# Patient Record
Sex: FEMALE | Race: BLACK OR AFRICAN AMERICAN | NOT HISPANIC OR LATINO | URBAN - METROPOLITAN AREA
[De-identification: names, ages, dates, MRNs, and addresses within clinical notes are randomized per-mention and may not be internally consistent; named-entity substitution may affect disease eponyms.]

---

## 2020-03-06 ENCOUNTER — EMERGENCY (EMERGENCY)
Facility: HOSPITAL | Age: 30
LOS: 1 days | Discharge: HOME | End: 2020-03-06
Attending: EMERGENCY MEDICINE
Payer: SELF-PAY

## 2020-03-06 VITALS
OXYGEN SATURATION: 99 % | WEIGHT: 160.06 LBS | RESPIRATION RATE: 17 BRPM | DIASTOLIC BLOOD PRESSURE: 87 MMHG | HEART RATE: 85 BPM | HEIGHT: 66 IN | SYSTOLIC BLOOD PRESSURE: 150 MMHG | TEMPERATURE: 98 F

## 2020-03-06 DIAGNOSIS — M25.661 STIFFNESS OF RIGHT KNEE, NOT ELSEWHERE CLASSIFIED: Chronic | ICD-10-CM

## 2020-03-06 LAB
APPEARANCE UR: CLEAR — SIGNIFICANT CHANGE UP
BILIRUB UR-MCNC: NEGATIVE — SIGNIFICANT CHANGE UP
COLOR SPEC: YELLOW — SIGNIFICANT CHANGE UP
DIFF PNL FLD: NEGATIVE — SIGNIFICANT CHANGE UP
GLUCOSE UR QL: NEGATIVE MG/DL — SIGNIFICANT CHANGE UP
HCG UR QL: NEGATIVE — SIGNIFICANT CHANGE UP
KETONES UR-MCNC: NEGATIVE — SIGNIFICANT CHANGE UP
LEUKOCYTE ESTERASE UR-ACNC: NEGATIVE — SIGNIFICANT CHANGE UP
NITRITE UR-MCNC: NEGATIVE — SIGNIFICANT CHANGE UP
PH UR: 7 — SIGNIFICANT CHANGE UP (ref 5–8)
PROT UR-MCNC: NEGATIVE MG/DL — SIGNIFICANT CHANGE UP
SP GR SPEC: 1.01 — SIGNIFICANT CHANGE UP (ref 1.01–1.02)
UROBILINOGEN FLD QL: NEGATIVE MG/DL — SIGNIFICANT CHANGE UP

## 2020-03-06 PROCEDURE — 72100 X-RAY EXAM L-S SPINE 2/3 VWS: CPT | Mod: 26

## 2020-03-06 PROCEDURE — 72070 X-RAY EXAM THORAC SPINE 2VWS: CPT

## 2020-03-06 PROCEDURE — 99284 EMERGENCY DEPT VISIT MOD MDM: CPT | Mod: 25

## 2020-03-06 PROCEDURE — 99284 EMERGENCY DEPT VISIT MOD MDM: CPT

## 2020-03-06 PROCEDURE — 72100 X-RAY EXAM L-S SPINE 2/3 VWS: CPT

## 2020-03-06 PROCEDURE — 81025 URINE PREGNANCY TEST: CPT

## 2020-03-06 PROCEDURE — 72070 X-RAY EXAM THORAC SPINE 2VWS: CPT | Mod: 26

## 2020-03-06 PROCEDURE — 96372 THER/PROPH/DIAG INJ SC/IM: CPT

## 2020-03-06 PROCEDURE — 81003 URINALYSIS AUTO W/O SCOPE: CPT

## 2020-03-06 RX ORDER — IBUPROFEN 200 MG
1 TABLET ORAL
Qty: 28 | Refills: 0
Start: 2020-03-06 | End: 2020-03-12

## 2020-03-06 RX ORDER — METHOCARBAMOL 500 MG/1
1 TABLET, FILM COATED ORAL
Qty: 14 | Refills: 0
Start: 2020-03-06 | End: 2020-03-12

## 2020-03-06 RX ORDER — KETOROLAC TROMETHAMINE 30 MG/ML
30 SYRINGE (ML) INJECTION ONCE
Refills: 0 | Status: DISCONTINUED | OUTPATIENT
Start: 2020-03-06 | End: 2020-03-06

## 2020-03-06 RX ORDER — METHOCARBAMOL 500 MG/1
1500 TABLET, FILM COATED ORAL ONCE
Refills: 0 | Status: COMPLETED | OUTPATIENT
Start: 2020-03-06 | End: 2020-03-06

## 2020-03-06 RX ADMIN — METHOCARBAMOL 1500 MILLIGRAM(S): 500 TABLET, FILM COATED ORAL at 13:48

## 2020-03-06 RX ADMIN — Medication 30 MILLIGRAM(S): at 13:48
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
## 2020-03-06 NOTE — ED PROVIDER NOTE - NSFOLLOWUPCLINICS_GEN_ALL_ED_FT
[id number] [place] [address]  Phone: [phone]  Fax:   Follow Up Time:     [facility] - [facility]  Neurosurgery/Spine  [address] [place] [address]  Phone: [phone]  Fax:   Follow Up Time: 4-6 Days

## 2020-03-06 NOTE — ED PROVIDER NOTE - PATIENT PORTAL LINK FT
You can access the FollowMyHealth Patient Portal offered by [facility] by registering at the following website: [email or web address]. By joining [employer]’s FollowMyHealth portal, you will also be able to view your health information using other applications (apps) compatible with our system.

## 2020-03-06 NOTE — SBIRT NOTE ADULT - NSSBIRTALCPASSREFTXDET_GEN_A_CORE
Provided SBIRT services: Full screen positive. Referral to Treatment attempted. Screening results were reviewed with the patient and patient was provided information about healthy guidelines and potential negative consequences associated with level of risk. Motivation and readiness to reduce or stop use was discussed and goals and activities to make changes were suggested/offered.  Options discussed for further evaluation and treatment, but referral to treatment was not completed because  Patient refused - Wants to make appt at [facility] -  provided resources for [facility] and [facility] Provided SBIRT services: Full screen positive. Referral to Treatment attempted. Screening results were reviewed with the patient and patient was provided information about healthy guidelines and potential negative consequences associated with level of risk. Motivation and readiness to reduce or stop use was discussed and goals and activities to make changes were suggested/offered.  Options discussed for further evaluation and treatment, but referral to treatment was not completed because  Patient refused - Wants to make appt at [facility] provided resources for [facility] and [facility]
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
## 2020-03-06 NOTE — ED ADULT NURSE NOTE - NSIMPLEMENTINTERV_GEN_ALL_ED
Implemented All Fall Risk Interventions:  [place] to call system. Call bell, personal items and telephone within reach. Instruct patient to call for assistance. Room bathroom lighting operational. Non-slip footwear when patient is off stretcher. Physically safe environment: no spills, clutter or unnecessary equipment. Stretcher in lowest position, wheels locked, appropriate side rails in place. Provide visual cue, wrist band, yellow gown, etc. Monitor gait and stability. Monitor for mental status changes and reorient to person, place, and time. Review medications for side effects contributing to fall risk. Reinforce activity limits and safety measures with patient and family.

## 2020-03-06 NOTE — ED PROVIDER NOTE - ATTENDING CONTRIBUTION TO CARE
back pain after moving a couch down a staircase.  No b/b dysfunction.  no saddle aesthesia.  no numbness/ paresthesia of legs.  PE:  nonlocalized midline and para spinal muscle tenderness in thoracic and lumbar spine.  Xray:  scoliosis of thoracic spine, no obvious fracture.  A/P nonspecific back pain: anticipatory guidance provided.

## 2020-03-06 NOTE — ED PROVIDER NOTE - OBJECTIVE STATEMENT
30 y/o female presents c/o back pain x 3 days. PT reports on Wednesday she was helping a friend move a couch down steps  and the couch fell down the steps.  She reports it caused her to "jerk forward" and landed on the couch as it went down the stairs. Denies LOC, HA, dizziness, changes in vision, nausea, vomiting, neck pain, neck stiffness, paresthesia, weakness in extremities, , sob, palpitations, , pelvic pain, or extremity pain. PT also reports chest pain with movment and deep breathing as well as generalized abdominal pain. PT also reports some dysuria this morning.

## 2020-06-05 ENCOUNTER — TRANSCRIPTION ENCOUNTER (OUTPATIENT)
Age: 30
End: 2020-06-05

## 2020-11-10 ENCOUNTER — EMERGENCY (EMERGENCY)
Facility: HOSPITAL | Age: 30
LOS: 1 days | Discharge: HOME | End: 2020-11-10
Attending: EMERGENCY MEDICINE
Payer: COMMERCIAL

## 2020-11-10 VITALS
WEIGHT: 197.09 LBS | RESPIRATION RATE: 20 BRPM | TEMPERATURE: 98 F | HEART RATE: 55 BPM | SYSTOLIC BLOOD PRESSURE: 129 MMHG | HEIGHT: 66 IN | OXYGEN SATURATION: 98 % | DIASTOLIC BLOOD PRESSURE: 60 MMHG

## 2020-11-10 VITALS
OXYGEN SATURATION: 99 % | SYSTOLIC BLOOD PRESSURE: 127 MMHG | DIASTOLIC BLOOD PRESSURE: 77 MMHG | RESPIRATION RATE: 18 BRPM | HEART RATE: 54 BPM | TEMPERATURE: 98 F

## 2020-11-10 DIAGNOSIS — M25.661 STIFFNESS OF RIGHT KNEE, NOT ELSEWHERE CLASSIFIED: Chronic | ICD-10-CM

## 2020-11-10 LAB
ALBUMIN SERPL ELPH-MCNC: 4.5 G/DL — SIGNIFICANT CHANGE UP (ref 3.3–5.2)
ALP SERPL-CCNC: 60 U/L — SIGNIFICANT CHANGE UP (ref 40–120)
ALT FLD-CCNC: 16 U/L — SIGNIFICANT CHANGE UP
ANION GAP SERPL CALC-SCNC: 14 MMOL/L — SIGNIFICANT CHANGE UP (ref 5–17)
AST SERPL-CCNC: 24 U/L — SIGNIFICANT CHANGE UP
BASOPHILS # BLD AUTO: 0.05 K/UL — SIGNIFICANT CHANGE UP (ref 0–0.2)
BASOPHILS NFR BLD AUTO: 0.8 % — SIGNIFICANT CHANGE UP (ref 0–2)
BILIRUB SERPL-MCNC: 1.1 MG/DL — SIGNIFICANT CHANGE UP (ref 0.4–2)
BUN SERPL-MCNC: 10 MG/DL — SIGNIFICANT CHANGE UP (ref 8–20)
CALCIUM SERPL-MCNC: 9.3 MG/DL — SIGNIFICANT CHANGE UP (ref 8.6–10.2)
CHLORIDE SERPL-SCNC: 102 MMOL/L — SIGNIFICANT CHANGE UP (ref 98–107)
CO2 SERPL-SCNC: 25 MMOL/L — SIGNIFICANT CHANGE UP (ref 22–29)
CREAT SERPL-MCNC: 0.63 MG/DL — SIGNIFICANT CHANGE UP (ref 0.5–1.3)
EOSINOPHIL # BLD AUTO: 0.06 K/UL — SIGNIFICANT CHANGE UP (ref 0–0.5)
EOSINOPHIL NFR BLD AUTO: 0.9 % — SIGNIFICANT CHANGE UP (ref 0–6)
GLUCOSE SERPL-MCNC: 79 MG/DL — SIGNIFICANT CHANGE UP (ref 70–99)
HCG SERPL-ACNC: <4 MIU/ML — SIGNIFICANT CHANGE UP
HCT VFR BLD CALC: 38.3 % — SIGNIFICANT CHANGE UP (ref 34.5–45)
HGB BLD-MCNC: 12.9 G/DL — SIGNIFICANT CHANGE UP (ref 11.5–15.5)
IMM GRANULOCYTES NFR BLD AUTO: 0.3 % — SIGNIFICANT CHANGE UP (ref 0–1.5)
LIDOCAIN IGE QN: 19 U/L — LOW (ref 22–51)
LYMPHOCYTES # BLD AUTO: 2.69 K/UL — SIGNIFICANT CHANGE UP (ref 1–3.3)
LYMPHOCYTES # BLD AUTO: 42.3 % — SIGNIFICANT CHANGE UP (ref 13–44)
MCHC RBC-ENTMCNC: 26.9 PG — LOW (ref 27–34)
MCHC RBC-ENTMCNC: 33.7 GM/DL — SIGNIFICANT CHANGE UP (ref 32–36)
MCV RBC AUTO: 80 FL — SIGNIFICANT CHANGE UP (ref 80–100)
MONOCYTES # BLD AUTO: 0.47 K/UL — SIGNIFICANT CHANGE UP (ref 0–0.9)
MONOCYTES NFR BLD AUTO: 7.4 % — SIGNIFICANT CHANGE UP (ref 2–14)
NEUTROPHILS # BLD AUTO: 3.07 K/UL — SIGNIFICANT CHANGE UP (ref 1.8–7.4)
NEUTROPHILS NFR BLD AUTO: 48.3 % — SIGNIFICANT CHANGE UP (ref 43–77)
PLATELET # BLD AUTO: 331 K/UL — SIGNIFICANT CHANGE UP (ref 150–400)
POTASSIUM SERPL-MCNC: 3.7 MMOL/L — SIGNIFICANT CHANGE UP (ref 3.5–5.3)
POTASSIUM SERPL-SCNC: 3.7 MMOL/L — SIGNIFICANT CHANGE UP (ref 3.5–5.3)
PROT SERPL-MCNC: 7.4 G/DL — SIGNIFICANT CHANGE UP (ref 6.6–8.7)
RBC # BLD: 4.79 M/UL — SIGNIFICANT CHANGE UP (ref 3.8–5.2)
RBC # FLD: 13.5 % — SIGNIFICANT CHANGE UP (ref 10.3–14.5)
SODIUM SERPL-SCNC: 141 MMOL/L — SIGNIFICANT CHANGE UP (ref 135–145)
WBC # BLD: 6.36 K/UL — SIGNIFICANT CHANGE UP (ref 3.8–10.5)
WBC # FLD AUTO: 6.36 K/UL — SIGNIFICANT CHANGE UP (ref 3.8–10.5)

## 2020-11-10 PROCEDURE — 83690 ASSAY OF LIPASE: CPT

## 2020-11-10 PROCEDURE — 99284 EMERGENCY DEPT VISIT MOD MDM: CPT | Mod: 25

## 2020-11-10 PROCEDURE — 36415 COLL VENOUS BLD VENIPUNCTURE: CPT

## 2020-11-10 PROCEDURE — 84702 CHORIONIC GONADOTROPIN TEST: CPT

## 2020-11-10 PROCEDURE — 96374 THER/PROPH/DIAG INJ IV PUSH: CPT

## 2020-11-10 PROCEDURE — 99284 EMERGENCY DEPT VISIT MOD MDM: CPT

## 2020-11-10 PROCEDURE — 85025 COMPLETE CBC W/AUTO DIFF WBC: CPT

## 2020-11-10 PROCEDURE — 80053 COMPREHEN METABOLIC PANEL: CPT

## 2020-11-10 RX ORDER — ONDANSETRON 8 MG/1
4 TABLET, FILM COATED ORAL ONCE
Refills: 0 | Status: COMPLETED | OUTPATIENT
Start: 2020-11-10 | End: 2020-11-10

## 2020-11-10 RX ORDER — SODIUM CHLORIDE 9 MG/ML
2000 INJECTION INTRAMUSCULAR; INTRAVENOUS; SUBCUTANEOUS ONCE
Refills: 0 | Status: COMPLETED | OUTPATIENT
Start: 2020-11-10 | End: 2020-11-10

## 2020-11-10 RX ORDER — ONDANSETRON 8 MG/1
1 TABLET, FILM COATED ORAL
Qty: 9 | Refills: 0
Start: 2020-11-10 | End: 2020-11-12

## 2020-11-10 RX ADMIN — SODIUM CHLORIDE 2000 MILLILITER(S): 9 INJECTION INTRAMUSCULAR; INTRAVENOUS; SUBCUTANEOUS at 19:14

## 2020-11-10 RX ADMIN — ONDANSETRON 4 MILLIGRAM(S): 8 TABLET, FILM COATED ORAL at 19:14

## 2020-11-10 NOTE — ED ADULT NURSE REASSESSMENT NOTE - NS ED NURSE REASSESS COMMENT FT1
pt hemodynamically stable, PIV removed, refer to flowsheet and chart, pt to be discharged, pt understood discharge instructions and plan of care. Pt medically cleared by LILIANA Xiao.

## 2020-11-10 NOTE — ED STATDOCS - PROGRESS NOTE DETAILS
PT evaluated by intake physician. HPI/PE/ROS as noted above. Will follow up plan per intake physician. po challenge successful, reviewed lab work pt explained d/c instructions

## 2020-11-10 NOTE — ED STATDOCS - OBJECTIVE STATEMENT
30 y.o F with a PMHx of asthma and PSHx of a rt knee drainage presents to the ED with c/o a potential alcohol poisoning. Pt has been drinking hard liquor and beer. Pt has been vomiting since last night with associated abdominal pain. Pt notes of a HA. Pt denies fever and allergies. Pt has vomited 6 several times. Pt is daily drinker. Pt denies a hx of seizures. 30 y.o F with a PMHx of asthma and PSHx of a rt knee drainage presents to the ED with c/o nausea and vomiting s/p alcohol binge last night. Pt states she drank hard liquor and beer last night. Pt has been vomiting since last night with epigastric abdominal pain. Pt notes of a HA. Pt denies fever and allergies. Pt has vomited 6 times- NBNB. Pt is daily drinker- endorses feeling shaky when she does not drink however Pt denies a hx of EtOH w/d seizures.

## 2020-11-10 NOTE — ED STATDOCS - PHYSICAL EXAMINATION
Gen: NAD, AOx3  Head: NCAT  Lung: CTAB, no respiratory distress, no wheezing, rales, rhonchi  CV: normal s1/s2, rrr, no murmurs, Normal perfusion, pulses 2+ throughout  Abd: soft, NTND   MSK: No edema, no visible deformities, full range of motion in all 4 extremities  Neuro: No focal neurologic deficits  Skin: No rash   Psych: normal affect Gen: NAD, AOx3  Head: NCAT  Lung: CTAB, no respiratory distress, no wheezing, rales, rhonchi  CV: normal s1/s2, rrr, no murmurs, Normal perfusion, pulses 2+ throughout  Abd: soft, minimally tender epigastric region, nondistended  MSK: No edema, no visible deformities, full range of motion in all 4 extremities  Neuro: No focal neurologic deficits  Skin: No rash   Psych: normal affect

## 2020-11-10 NOTE — ED STATDOCS - ATTENDING CONTRIBUTION TO CARE
I, Neeta Carter, performed a face to face bedside interview with this patient regarding history of present illness, review of symptoms and relevant past medical, social and family history.  I completed an independent physical examination. Medical decision making, follow-up on ordered tests (ie labs, radiologic studies) and re-evaluation of the patient's status has been communicated to the ACP.  Disposition of the patient will be based on test outcome and response to ED interventions.

## 2020-11-10 NOTE — ED STATDOCS - CLINICAL SUMMARY MEDICAL DECISION MAKING FREE TEXT BOX
Pt with intractable vomiting s/p ETOH binge yesterday, meds, fluids, antiemetic and reassess Pt with intractable vomiting s/p ETOH binge yesterday, other than vomiting no signs/symptoms of EtOH w/d- tx with meds, fluids, antiemetic and reassess

## 2020-11-10 NOTE — ED ADULT TRIAGE NOTE - CHIEF COMPLAINT QUOTE
Pt. complaining of nausea, vomiting and headache that began today.  Pt. was drinking alcohol yesterday.

## 2021-02-26 ENCOUNTER — EMERGENCY (EMERGENCY)
Facility: HOSPITAL | Age: 31
LOS: 1 days | Discharge: HOME | End: 2021-02-26
Attending: EMERGENCY MEDICINE
Payer: COMMERCIAL

## 2021-02-26 VITALS
HEIGHT: 66 IN | OXYGEN SATURATION: 100 % | HEART RATE: 88 BPM | RESPIRATION RATE: 18 BRPM | TEMPERATURE: 98 F | DIASTOLIC BLOOD PRESSURE: 83 MMHG | SYSTOLIC BLOOD PRESSURE: 128 MMHG

## 2021-02-26 DIAGNOSIS — M25.661 STIFFNESS OF RIGHT KNEE, NOT ELSEWHERE CLASSIFIED: Chronic | ICD-10-CM

## 2021-02-26 DIAGNOSIS — Z98.890 OTHER SPECIFIED POSTPROCEDURAL STATES: Chronic | ICD-10-CM

## 2021-02-26 PROCEDURE — G1004: CPT

## 2021-02-26 PROCEDURE — 73564 X-RAY EXAM KNEE 4 OR MORE: CPT | Mod: 26,LT

## 2021-02-26 PROCEDURE — 73564 X-RAY EXAM KNEE 4 OR MORE: CPT

## 2021-02-26 PROCEDURE — 70486 CT MAXILLOFACIAL W/O DYE: CPT

## 2021-02-26 PROCEDURE — 70450 CT HEAD/BRAIN W/O DYE: CPT | Mod: 26,MG

## 2021-02-26 PROCEDURE — 99284 EMERGENCY DEPT VISIT MOD MDM: CPT | Mod: 25

## 2021-02-26 PROCEDURE — 70486 CT MAXILLOFACIAL W/O DYE: CPT | Mod: 26,MG

## 2021-02-26 PROCEDURE — 99285 EMERGENCY DEPT VISIT HI MDM: CPT

## 2021-02-26 PROCEDURE — 71046 X-RAY EXAM CHEST 2 VIEWS: CPT | Mod: 26

## 2021-02-26 PROCEDURE — 70450 CT HEAD/BRAIN W/O DYE: CPT

## 2021-02-26 PROCEDURE — 71046 X-RAY EXAM CHEST 2 VIEWS: CPT

## 2021-02-26 RX ORDER — OXYCODONE AND ACETAMINOPHEN 5; 325 MG/1; MG/1
1 TABLET ORAL ONCE
Refills: 0 | Status: DISCONTINUED | OUTPATIENT
Start: 2021-02-26 | End: 2021-02-26

## 2021-02-26 RX ORDER — METHOCARBAMOL 500 MG/1
1500 TABLET, FILM COATED ORAL ONCE
Refills: 0 | Status: COMPLETED | OUTPATIENT
Start: 2021-02-26 | End: 2021-02-26

## 2021-02-26 RX ADMIN — METHOCARBAMOL 1500 MILLIGRAM(S): 500 TABLET, FILM COATED ORAL at 20:55

## 2021-02-26 RX ADMIN — OXYCODONE AND ACETAMINOPHEN 1 TABLET(S): 5; 325 TABLET ORAL at 20:55
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
## 2021-02-26 NOTE — ED PROVIDER NOTE - PHYSICAL EXAMINATION
HEENT: tenderness to tleft orbit and nose, no obvious deformities no raccoon eyes, no reyna sings, no hemotympanum, PERRL, EOMI, no nystagmus, no dental injuries  Neck: supple, no midline tenderness to palpation, nt to cervical spine paraspinal m,+ FROM, NEXUS negative, no abrasions, no ecchymosis  Chest: + tender, equal expansion bilaterally, no ecchymosis, no abrasions, seatbelt sign negative.  Lungs: CTA, good air entry bilaterally, no wheezing, no rales, no rhonchi  Abdomen: soft, non tender, no guarding, no rebound, no distention, no ecchymosis  Back: no midline tenderness to palpation, + paraspinal m tendernesss  Extremities: atraumatic, + FROM, 5/5 strength  Skin: no rash, no lacs, + small abrasion to the inner lip  Neuro: A & O x 3, clear speech, steady gait, cerebellar intact, no focal deficits, CN II-XII intact, neg pronator sign HEENT: tenderness to left orbit and nose, no obvious deformities no raccoon eyes, no reyna sings, no hemotympanum, PERRL, EOMI, no nystagmus, no dental injuries  Neck: supple, no midline tenderness to palpation, nt to cervical spine paraspinal m,+ FROM, NEXUS negative, no abrasions, no ecchymosis  Chest: + tender, equal expansion bilaterally, no ecchymosis, no abrasions, seatbelt sign negative.  Lungs: CTA, good air entry bilaterally, no wheezing, no rales, no rhonchi  Abdomen: soft, non tender, no guarding, no rebound, no distention, no ecchymosis  Back: no midline tenderness to palpation, + paraspinal m tenderness  Extremities: atraumatic, + FROM, 5/5 strength  Skin: no rash, no lacs, + small abrasion to the inner lip  Neuro: A & O x 3, clear speech, steady gait, cerebellar intact, no focal deficits, CN II-XII intact, neg pronator sign.
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
## 2021-02-26 NOTE — ED PROVIDER NOTE - PROGRESS NOTE DETAILS
POLO- neg fractures, Pt reassessed, pt feeling better at this time, vss, pt able to walk, talk and vocalized plan of action. Discussed in depth and explained to pt in depth the next steps that need to be taking including proper follow up with PCP or specialists. All incidental findings were discussed with pt as well. Pt verbalized their concerns and all questions were answered. Pt understands dispo and wants discharge. Given good instructions when to return to ED and importance of f/u.

## 2021-02-26 NOTE — ED PROVIDER NOTE - CHPI ED SYMPTOMS NEG
no disorientation/no loss of consciousness/no neck tenderness/no crying/no decreased eating/drinking/no difficulty bearing weight/no fussiness/no sleeping issues

## 2021-02-26 NOTE — ED PROVIDER NOTE - PATIENT PORTAL LINK FT
You can access the FollowMyHealth Patient Portal offered by [facility] by registering at the following website: [email or web address]. By joining [employer]’s FollowMyHealth portal, you will also be able to view your health information using other applications (apps) compatible with our system.
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
## 2021-02-27 RX ORDER — METHOCARBAMOL 500 MG/1
1 TABLET, FILM COATED ORAL
Qty: 9 | Refills: 0
Start: 2021-02-27 | End: 2021-03-01

## 2023-01-23 NOTE — ED PROVIDER NOTE - NS ED ATTENDING STATEMENT MOD
Griseofulvin Counseling:  I discussed with the patient the risks of griseofulvin including but not limited to photosensitivity, cytopenia, liver damage, nausea/vomiting and severe allergy.  The patient understands that this medication is best absorbed when taken with a fatty meal (e.g., ice cream or french fries). Attending with

## 2023-07-14 ENCOUNTER — EMERGENCY (EMERGENCY)
Facility: HOSPITAL | Age: 33
LOS: 1 days | Discharge: HOME | End: 2023-07-14
Attending: EMERGENCY MEDICINE
Payer: COMMERCIAL

## 2023-07-14 VITALS
RESPIRATION RATE: 17 BRPM | TEMPERATURE: 98 F | WEIGHT: 195.11 LBS | HEART RATE: 89 BPM | OXYGEN SATURATION: 99 % | DIASTOLIC BLOOD PRESSURE: 79 MMHG | SYSTOLIC BLOOD PRESSURE: 140 MMHG

## 2023-07-14 DIAGNOSIS — Z98.890 OTHER SPECIFIED POSTPROCEDURAL STATES: Chronic | ICD-10-CM

## 2023-07-14 PROCEDURE — 29515 APPLICATION SHORT LEG SPLINT: CPT

## 2023-07-14 PROCEDURE — 73610 X-RAY EXAM OF ANKLE: CPT

## 2023-07-14 PROCEDURE — 99284 EMERGENCY DEPT VISIT MOD MDM: CPT | Mod: 25

## 2023-07-14 PROCEDURE — 90471 IMMUNIZATION ADMIN: CPT

## 2023-07-14 PROCEDURE — 73630 X-RAY EXAM OF FOOT: CPT

## 2023-07-14 PROCEDURE — 73590 X-RAY EXAM OF LOWER LEG: CPT

## 2023-07-14 PROCEDURE — 29515 APPLICATION SHORT LEG SPLINT: CPT | Mod: LT

## 2023-07-14 PROCEDURE — 73590 X-RAY EXAM OF LOWER LEG: CPT | Mod: 26,LT

## 2023-07-14 PROCEDURE — 73610 X-RAY EXAM OF ANKLE: CPT | Mod: 26,LT

## 2023-07-14 PROCEDURE — 90715 TDAP VACCINE 7 YRS/> IM: CPT

## 2023-07-14 PROCEDURE — 73630 X-RAY EXAM OF FOOT: CPT | Mod: 26,LT

## 2023-07-14 RX ORDER — TETANUS TOXOID, REDUCED DIPHTHERIA TOXOID AND ACELLULAR PERTUSSIS VACCINE, ADSORBED 5; 2.5; 8; 8; 2.5 [IU]/.5ML; [IU]/.5ML; UG/.5ML; UG/.5ML; UG/.5ML
0.5 SUSPENSION INTRAMUSCULAR ONCE
Refills: 0 | Status: COMPLETED | OUTPATIENT
Start: 2023-07-14 | End: 2023-07-14

## 2023-07-14 RX ORDER — IBUPROFEN 200 MG
800 TABLET ORAL ONCE
Refills: 0 | Status: COMPLETED | OUTPATIENT
Start: 2023-07-14 | End: 2023-07-14

## 2023-07-14 RX ORDER — ACETAMINOPHEN 500 MG
650 TABLET ORAL ONCE
Refills: 0 | Status: COMPLETED | OUTPATIENT
Start: 2023-07-14 | End: 2023-07-14

## 2023-07-14 RX ORDER — IBUPROFEN 200 MG
1 TABLET ORAL
Qty: 15 | Refills: 0
Start: 2023-07-14 | End: 2023-07-18

## 2023-07-14 RX ADMIN — Medication 800 MILLIGRAM(S): at 11:00

## 2023-07-14 RX ADMIN — Medication 650 MILLIGRAM(S): at 11:00

## 2023-07-14 RX ADMIN — TETANUS TOXOID, REDUCED DIPHTHERIA TOXOID AND ACELLULAR PERTUSSIS VACCINE, ADSORBED 0.5 MILLILITER(S): 5; 2.5; 8; 8; 2.5 SUSPENSION INTRAMUSCULAR at 10:59

## 2023-07-14 NOTE — ED ADULT NURSE REASSESSMENT NOTE - NS ED NURSE REASSESS COMMENT FT1
Patient discharged by provider LILIANA Falk. No signs of acute distress noted, respirations even and unlabored. Refer to provider notes.

## 2023-07-14 NOTE — ED PROVIDER NOTE - PROGRESS NOTE DETAILS
patient declined narcotic pain control XR reviewed +fibular FX, will place in splint, outpatient f/u with orthopedics
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
## 2023-07-14 NOTE — ED PROVIDER NOTE - PHYSICAL EXAMINATION
Constitutional - well-developed; well nourished. Head - NCAT. Airway patent. Eyes - PERRL. CV - RRR. no murmur. no edema. Pulm - CTAB. Abd - soft, nt. no rebound. no guarding. Neuro - A&Ox3. strength 5/5 x4. sensation intact x4. normal gait. Skin - No rash. MSK - +TTP along anterior tib/fib/ankle and foot, +soft tissue swelling, +abrasion to shin, +echymosis to lateral mallelous, DP and PT pulses palpable, motor and sensory sensation intact to LT, able to straight leg raise, NTTP patella or hip. Constitutional - well-developed; well nourished. Head - NCAT. Airway patent. Eyes - PERRL. CV - RRR. no murmur. no edema. Pulm - CTAB. Abd - soft, nt. no rebound. no guarding. Neuro - A&Ox3. strength 5/5 x4. sensation intact x4. normal gait. Skin - No rash. MSK - +TTP along anterior L tib/fib/ankle and foot, +soft tissue swelling, +abrasion to shin, +echymosis to lateral mallelous, DP and PT pulses palpable, motor and sensory sensation intact to LT, able to straight leg raise, NTTP patella or hip.

## 2023-07-14 NOTE — ED PROVIDER NOTE - NS ED ATTENDING STATEMENT MOD
This was a shared visit with the SUSANNE. I reviewed and verified the documentation and independently performed the documented:

## 2023-07-14 NOTE — ED PROVIDER NOTE - PATIENT PORTAL LINK FT
You can access the FollowMyHealth Patient Portal offered by [facility] by registering at the following website: [email or web address]. By joining [employer]’s FollowMyHealth portal, you will also be able to view your health information using other applications (apps) compatible with our system.

## 2023-07-14 NOTE — ED PROVIDER NOTE - CLINICAL SUMMARY MEDICAL DECISION MAKING FREE TEXT BOX
ankle/tib/fib/foot pain s/p twist injury on diving board x 1 day  unable to bear weight, PE consistent with possible FX  XR, pain control, possible orthopedic eval  abrasion, update tetanus L ankle/tib/fib/foot pain s/p twist injury on diving board x 1 day  unable to bear weight, PE consistent with possible FX  XR, pain control, possible orthopedic eval  abrasion, update tetanus

## 2023-07-14 NOTE — ED PROVIDER NOTE - NS ED ROS FT
No fever/chills, No photophobia/eye pain/changes in vision, No ear pain/sore throat/dysphagia, No chest pain/palpitations, no SOB/cough/wheeze/stridor, No abdominal pain, No N/V/D, no dysuria/frequency/discharge, +R ankle, tib/fib/and foot pain, No neck/back pain, no rash, no changes in neurological status/function.
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
## 2023-07-14 NOTE — ED PROVIDER NOTE - CARE PROVIDER_API CALL
Ez Guillaume  Orthopaedic Surgery  [address]  [place] [address]  Phone: [phone]  Fax: [phone]  Follow Up Time:

## 2023-07-14 NOTE — ED ADULT NURSE NOTE - NSFALLUNIVINTERV_ED_ALL_ED
Bed/Stretcher in lowest position, wheels locked, appropriate side rails in place/Call bell, personal items and telephone in reach/Instruct patient to call for assistance before getting out of bed/chair/stretcher/Non-slip footwear applied when patient is off stretcher/[place] to call system/Physically safe environment - no spills, clutter or unnecessary equipment/Purposeful proactive rounding/Room/bathroom lighting operational, light cord in reach

## 2023-07-14 NOTE — ED PROVIDER NOTE - NSFOLLOWUPINSTRUCTIONS_ED_ALL_ED_FT
Fracture    A fracture is a break in one of your bones. This can occur from a variety of injuries, especially traumatic ones. Symptoms include pain, bruising, or swelling. Do not use the injured limb. If a fracture is in one of the bones below your waist, do not put weight on that limb unless instructed to do so by your healthcare provider. Crutches or a cane may have been provided. A splint or cast may have been applied by your health care provider. Make sure to keep it dry and follow up with an orthopedist as instructed.    SEEK IMMEDIATE MEDICAL CARE IF YOU HAVE ANY OF THE FOLLOWING SYMPTOMS: numbness, tingling, increasing pain, or weakness in any part of the injured limb.      Please follow up with orthopedics  Rest, ice, elevate  Take motrin or tylenol as needed for pain

## 2023-07-14 NOTE — ED ADULT NURSE NOTE - CAS ELECT INFOMATION PROVIDED
Patient discharged by provider LILIANA Falk. No signs of acute distress noted, respirations even and unlabored. Refer to provider notes./DC instructions

## 2023-07-20 ENCOUNTER — APPOINTMENT (OUTPATIENT)
Dept: ORTHOPEDIC SURGERY | Facility: CLINIC | Age: 33
End: 2023-07-20
Payer: COMMERCIAL

## 2023-07-20 ENCOUNTER — NON-APPOINTMENT (OUTPATIENT)
Age: 33
End: 2023-07-20

## 2023-07-20 VITALS — WEIGHT: 200 LBS | HEIGHT: 66 IN | BODY MASS INDEX: 32.14 KG/M2

## 2023-07-20 DIAGNOSIS — M79.662 PAIN IN LEFT LOWER LEG: ICD-10-CM

## 2023-07-20 PROCEDURE — 99203 OFFICE O/P NEW LOW 30 MIN: CPT

## 2023-07-20 RX ORDER — ASPIRIN 325 MG/1
325 TABLET, COATED ORAL
Qty: 30 | Refills: 2 | Status: ACTIVE | COMMUNITY
Start: 2023-07-20 | End: 1900-01-01

## 2023-07-20 RX ORDER — OXYCODONE 5 MG/1
5 TABLET ORAL
Qty: 30 | Refills: 0 | Status: ACTIVE | COMMUNITY
Start: 2023-07-20 | End: 1900-01-01

## 2023-07-20 NOTE — DISCUSSION/SUMMARY
[de-identified] : I took the protective splint off the patient to evaluate the ankle and her skin.  She is very swollen.  I do want to proceed with a CT scan of the left ankle to evaluate the fracture and ankle.  I placed the patient in a long cam boot and she is to continue nonweightbearing with crutches.  She will continue with RICE therapy for swelling control.  Venous Doppler ultrasound stat order in to evaluate for DVT as the patient was not protected with blood thinners/aspirin for 1 week since the injury.  Once the CT scan and venous Doppler ultrasound are completed, the patient return next week to meet with Dr. Longoria to review the CT scan and discuss fracture care.  All of her questions were answered.  She understood and agreed to the treatment course.
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
## 2023-07-20 NOTE — PHYSICAL EXAM
[de-identified] : Left ankle Physical Examination:\par \par General: Alert and oriented x3.  In no acute distress.  Pleasant in nature with a normal affect.  No apparent respiratory distress. \par Erythema, Warmth, Rubor: Negative\par Swelling: Positive\par \par ROM:\par Limited range of motion due to the fracture and pain with swelling.\par \par Tenderness to Palpation: \par 1. Lateral Malleolus: Positive\par 2. Medial Malleolus: Negative\par 3. Proximal Fibular Pain: Negative\par 4. Heel Pain: Negative\par 5. Cuboid: Negative\par 6. Navicular: Negative\par 7. Tibiotalar Joint: Negative\par 8. Subtalar Joint: Negative\par 9. Posterior Recess: Negative\par \par Tendon Pain:\par 1. Achilles: Negative\par 2. Peroneals: Negative\par 3. Posterior Tibialis: Negative\par 4. Tibialis Anterior: Negative\par \par Ligament Pain:\par 1. ATFL: Negative\par 2. CFL: Negative \par 3. PTFL: Negative\par 4. Deltoid Ligaments: Positive\par 5. Lis Franc Ligament: Negative\par \par Stability: \par 1. Anterior Drawer: Negative\par 2. Posterior Drawer: Negative\par \par Strength: 5/5 TA/GS/EHL\par \par Pulses: 2+ DP/PT Pulses\par \par Neuro: Intact motor and sensory\par \par Additional Test:\par 1. Calcaneal Squeeze Test: Negative\par 2. Syndesmosis Squeeze Test: Negative [de-identified] : ACC: [id number] EXAM: XR TIB FIB AP LAT 2 VIEWS LT ORDERED BY: KG KAUFMAN\par \par ACC: [id number] EXAM: XR FOOT COMP MIN 3 VIEWS LT ORDERED BY: KG KAUFMAN\par \par ACC: [id number] EXAM: XR ANKLE COMP MIN 3 VIEWS LT ORDERED BY: KG KAUFMAN\par \par PROCEDURE DATE: 07/14/2023\par \par \par \par INTERPRETATION: Left tib-fib, ankle, and foot. Patient has local pain after a fall.\par \par Left tib-fib. 2 views.\par \par Left knee unremarkable. Proximal bones intact.\par \par Left ankle. 3 views.\par \par There is diffuse swelling around the ankle.\par \par There is a fracture of the lower shaft of the fibula and fairly good alignment.\par \par \par \par Foot. 3 views. No further fracture.\par \par IMPRESSION: Fracture of the lower shaft of the left fibula.\par \par --- End of Report ---\par \par \par \par \par \par RAFAELA JEAN MD; Attending Radiologist\par This document has been electronically signed. Jul 14 2023 12:49PM

## 2023-07-21 ENCOUNTER — APPOINTMENT (OUTPATIENT)
Dept: ULTRASOUND IMAGING | Facility: CLINIC | Age: 33
End: 2023-07-21

## 2023-07-21 ENCOUNTER — OUTPATIENT (OUTPATIENT)
Dept: OUTPATIENT SERVICES | Facility: HOSPITAL | Age: 33
LOS: 1 days | End: 2023-07-21
Payer: COMMERCIAL

## 2023-07-21 ENCOUNTER — RESULT REVIEW (OUTPATIENT)
Age: 33
End: 2023-07-21

## 2023-07-21 ENCOUNTER — APPOINTMENT (OUTPATIENT)
Dept: CT IMAGING | Facility: CLINIC | Age: 33
End: 2023-07-21
Payer: COMMERCIAL

## 2023-07-21 DIAGNOSIS — Z98.890 OTHER SPECIFIED POSTPROCEDURAL STATES: Chronic | ICD-10-CM

## 2023-07-21 DIAGNOSIS — S99.912A UNSPECIFIED INJURY OF LEFT ANKLE, INITIAL ENCOUNTER: ICD-10-CM

## 2023-07-21 DIAGNOSIS — M25.572 PAIN IN LEFT ANKLE AND JOINTS OF LEFT FOOT: ICD-10-CM

## 2023-07-21 DIAGNOSIS — S82.832A OTHER FRACTURE OF UPPER AND LOWER END OF LEFT FIBULA, INITIAL ENCOUNTER FOR CLOSED FRACTURE: ICD-10-CM

## 2023-07-21 PROCEDURE — 76376 3D RENDER W/INTRP POSTPROCES: CPT | Mod: 26

## 2023-07-21 PROCEDURE — 76376 3D RENDER W/INTRP POSTPROCES: CPT

## 2023-07-21 PROCEDURE — 73700 CT LOWER EXTREMITY W/O DYE: CPT | Mod: 26,LT

## 2023-07-21 PROCEDURE — 93971 EXTREMITY STUDY: CPT | Mod: 26,LT

## 2023-07-21 PROCEDURE — 73700 CT LOWER EXTREMITY W/O DYE: CPT

## 2023-07-24 ENCOUNTER — APPOINTMENT (OUTPATIENT)
Dept: ORTHOPEDIC SURGERY | Facility: CLINIC | Age: 33
End: 2023-07-24
Payer: COMMERCIAL

## 2023-07-24 PROCEDURE — 99214 OFFICE O/P EST MOD 30 MIN: CPT

## 2023-07-24 NOTE — HISTORY OF PRESENT ILLNESS
[FreeTextEntry1] : 7/24/23: The patient is a 33-year-old female who presents with a left ankle Kay B fracture and is here to review the CT scan with Dr. Longoria.\par \par 7/20/23: The patient is a 33-year-old female who presents with left ankle pain.  Last Friday, 7/14/2023, she rolled her left ankle causing the injury.  She went to the hospital where she was evaluated and had x-rays of the left ankle which showed a Kay B displaced fracture left ankle.  She presents with a protective splint on, nonweightbearing.  She is not on aspirin or blood clot prevention medications.  The patient currently smokes cigarettes daily.  Patient does have swelling and 7 out of 10 pain left ankle.  No other complaints.  Denies fevers, chills, night's, shortness of breath, calf pain.

## 2023-07-24 NOTE — PHYSICAL EXAM
[de-identified] : Left ankle Physical Examination:\par \par General: Alert and oriented x3.  In no acute distress.  Pleasant in nature with a normal affect.  No apparent respiratory distress. \par Erythema, Warmth, Rubor: Negative\par Swelling: Positive\par \par ROM:\par Limited range of motion due to the fracture and pain with swelling.\par \par Tenderness to Palpation: \par 1. Lateral Malleolus: Positive\par 2. Medial Malleolus: Negative\par 3. Proximal Fibular Pain: Negative\par 4. Heel Pain: Negative\par 5. Cuboid: Negative\par 6. Navicular: Negative\par 7. Tibiotalar Joint: Negative\par 8. Subtalar Joint: Negative\par 9. Posterior Recess: Negative\par \par Tendon Pain:\par 1. Achilles: Negative\par 2. Peroneals: Negative\par 3. Posterior Tibialis: Negative\par 4. Tibialis Anterior: Negative\par \par Ligament Pain:\par 1. ATFL: Negative\par 2. CFL: Negative \par 3. PTFL: Negative\par 4. Deltoid Ligaments: Positive\par 5. Lis Franc Ligament: Negative\par \par Stability: \par 1. Anterior Drawer: Negative\par 2. Posterior Drawer: Negative\par \par Strength: 5/5 TA/GS/EHL\par \par Pulses: 2+ DP/PT Pulses\par \par Neuro: Intact motor and sensory\par \par Additional Test:\par 1. Calcaneal Squeeze Test: Negative\par 2. Syndesmosis Squeeze Test: Negative [de-identified] : CT 3D Reconstruct w/o Workstation             Final\par \par No Documents Attached\par \par \par \par Changed By [facility] - Reason: RADIOLOGIST ORDERED \par \par \par \par   EXAM: [id number] - CT ANKLE ONLY LT  - ORDERED BY: MICAELA JULIAN\par \par EXAM: [id number] - CT 3D RECONSTRUCT WO [facility]  - ORDERED BY: MICAELA JULIAN\par \par \par PROCEDURE DATE:  07/21/2023\par \par \par \par INTERPRETATION:  EXAMINATION: CT 3D RECONSTRUCTION WO WORKSTATION, CT ANKLE LEFT\par \par CLINICAL INDICATION:Evaluate for fracture.\par \par COMPARISON: Lower leg and ankle radiographs dated 7/14/2023\par \par TECHNIQUE: CT of the left ankle was performed without intravenous contrast. 3-D images were also obtained.\par \par INTERPRETATION:\par \par Bones and joints: There is an acute fracture of the distal fibula with an oblique component above the level of the ankle mortise and a transverse component at the level of the ankle mortise. There is comminution with a rotated and displaced fracture fragment measuring 1.1 x 1.2 cm present at the level of the fibular fracture slightly interposed between the lateral talar dome and lateral tibial plafond and as seen on series 7 image 56 and axial series 4 image 98. Additional smaller adjacent fracture fragments are present at the lateral margin of the lateral malleolus.\par \par There is a tiny 2 mm density abutting the medial margin of the medial malleolus in the expected region of the deltoid ligament which may represent a tiny cortical avulsion fracture fragment.\par \par There is no talus, calcaneus, or posterior malleolar fracture.\par \par There is a large tibiotalar joint effusion. There is a bone island within the body of the talus.\par \par Soft tissues: There is prominent soft tissue edema about the lateral malleolus in the area of the fracture. There is also prominent dorsal foot soft tissue edema. There is no retracted tendon tear within the limits of CT.\par \par IMPRESSION:\par \par 1.  Acute predominantly oblique fracture of the distal fibula at and above the level of the ankle mortise with comminution. A displaced and rotated 1.2 cm fracture fragment is partly interposed between the tibia and talus, at the lateral mortise, as described.\par \par 2.  Small focus of density in the region of the deltoid ligament abutting medial malleolus tip, may represent tiny cortical avulsion fracture.\par \par 3.  Bimalleolar edema, worst laterally.\par \par --- End of Report ---\par \par \par \par \par \par \par SHLOMIT A GOLDBERG-STEIN MD; Attending Radiologist\par This document has been electronically signed. Jul 21 2023  2:43PM\par \par  \par \par  Ordered by: MICAELA JULIAN       Collected/Examined: [id number] 02:36PM       \par Verified by: MICAELA JULIAN [id number] 03:26PM       \par  Result Communication: No patient communication needed at this time;\par Stage: Final       \par  Performed at: [facility]       Resulted: [id number] 02:37PM       Last Updated: 21Jul2023 03:26PM       Accession: [id number]

## 2023-07-24 NOTE — DISCUSSION/SUMMARY
[de-identified] : Today in the office I had a lengthy conversation with the patient regarding her fracture, her extensive smoking history, medical comorbidities and risk factors surrounding the treatment spectrum which includes but is not limited to surgical fixation of the distal fibula as well as stress examination under anesthesia versus conservative treatment of the ankle fracture.\par \par The patient was girlfriend was present during this conversation.  At this point we will defer surgery and continue with conservative management until I see her back in 10 days.  The patient expressed interest in being able to return to work as soon as possible given the length of time regarding her healing.  I did provide her with a 4 to 7-month spectrum surrounding overall recovery to be as close to 100% as possible.  The patient wishes to do what ever intervention will help get her back to work as fast as possible and I provided education and reassurance surrounding both options.  I will see her back in the office in 10 days we will get new x-rays.  All questions were answered and the patient verbalized understanding.  The patient is in agreement with this treatment plan.

## 2023-08-07 ENCOUNTER — APPOINTMENT (OUTPATIENT)
Dept: ORTHOPEDIC SURGERY | Facility: CLINIC | Age: 33
End: 2023-08-07
Payer: COMMERCIAL

## 2023-08-07 DIAGNOSIS — M25.572 PAIN IN LEFT ANKLE AND JOINTS OF LEFT FOOT: ICD-10-CM

## 2023-08-07 PROCEDURE — 99213 OFFICE O/P EST LOW 20 MIN: CPT | Mod: 25

## 2023-08-07 PROCEDURE — 27786 TREATMENT OF ANKLE FRACTURE: CPT | Mod: LT

## 2023-08-07 NOTE — DISCUSSION/SUMMARY
[de-identified] : Today in the office I had a lengthy conversation with the patient regarding her fracture, her extensive smoking history, medical comorbidities and risk factors surrounding the treatment spectrum which includes but is not limited to surgical fixation of the distal fibula as well as stress examination under anesthesia versus conservative treatment of the ankle fracture.\par  \par  The patient was girlfriend was present during this conversation.  At this point we will defer surgery and continue with conservative management until I see her back in 10 days.  The patient expressed interest in being able to return to work as soon as possible given the length of time regarding her healing.  I did provide her with a 4 to 7-month spectrum surrounding overall recovery to be as close to 100% as possible.  The patient wishes to do what ever intervention will help get her back to work as fast as possible and I provided education and reassurance surrounding both options.  I will see her back in the office in 10 days we will get new x-rays.  All questions were answered and the patient verbalized understanding.  The patient is in agreement with this treatment plan.

## 2023-08-07 NOTE — PHYSICAL EXAM
[de-identified] : Left ankle Physical Examination:  General: Alert and oriented x3.  In no acute distress.  Pleasant in nature with a normal affect.  No apparent respiratory distress.  Erythema, Warmth, Rubor: Negative Swelling: Positive  ROM: Limited range of motion due to the fracture and pain with swelling.  Tenderness to Palpation:  1. Lateral Malleolus: Positive 2. Medial Malleolus: Negative 3. Proximal Fibular Pain: Negative 4. Heel Pain: Negative 5. Cuboid: Negative 6. Navicular: Negative 7. Tibiotalar Joint: Negative 8. Subtalar Joint: Negative 9. Posterior Recess: Negative  Tendon Pain: 1. Achilles: Negative 2. Peroneals: Negative 3. Posterior Tibialis: Negative 4. Tibialis Anterior: Negative  Ligament Pain: 1. ATFL: Negative 2. CFL: Negative  3. PTFL: Negative 4. Deltoid Ligaments: Positive 5. Lis Franc Ligament: Negative  Stability:  1. Anterior Drawer: Negative 2. Posterior Drawer: Negative  Strength: 5/5 TA/GS/EHL  Pulses: 2+ DP/PT Pulses  Neuro: Intact motor and sensory  Additional Test: 1. Calcaneal Squeeze Test: Negative 2. Syndesmosis Squeeze Test: Negative [de-identified] : CT 3D Reconstruct w/o Workstation             Final  No Documents Attached    Changed By [facility] - Reason: RADIOLOGIST ORDERED       EXAM: [id number] - CT ANKLE ONLY LT  - ORDERED BY: MICAELA JULIAN  EXAM: [id number] - CT 3D RECONSTRUCT WO [facility]  - ORDERED BY: MICAELA JULIAN   PROCEDURE DATE:  07/21/2023    INTERPRETATION:  EXAMINATION: CT 3D RECONSTRUCTION WO WORKSTATION, CT ANKLE LEFT  CLINICAL INDICATION:Evaluate for fracture.  COMPARISON: Lower leg and ankle radiographs dated 7/14/2023  TECHNIQUE: CT of the left ankle was performed without intravenous contrast. 3-D images were also obtained.  INTERPRETATION:  Bones and joints: There is an acute fracture of the distal fibula with an oblique component above the level of the ankle mortise and a transverse component at the level of the ankle mortise. There is comminution with a rotated and displaced fracture fragment measuring 1.1 x 1.2 cm present at the level of the fibular fracture slightly interposed between the lateral talar dome and lateral tibial plafond and as seen on series 7 image 56 and axial series 4 image 98. Additional smaller adjacent fracture fragments are present at the lateral margin of the lateral malleolus.  There is a tiny 2 mm density abutting the medial margin of the medial malleolus in the expected region of the deltoid ligament which may represent a tiny cortical avulsion fracture fragment.  There is no talus, calcaneus, or posterior malleolar fracture.  There is a large tibiotalar joint effusion. There is a bone island within the body of the talus.  Soft tissues: There is prominent soft tissue edema about the lateral malleolus in the area of the fracture. There is also prominent dorsal foot soft tissue edema. There is no retracted tendon tear within the limits of CT.  IMPRESSION:  1.  Acute predominantly oblique fracture of the distal fibula at and above the level of the ankle mortise with comminution. A displaced and rotated 1.2 cm fracture fragment is partly interposed between the tibia and talus, at the lateral mortise, as described.  2.  Small focus of density in the region of the deltoid ligament abutting medial malleolus tip, may represent tiny cortical avulsion fracture.  3.  Bimalleolar edema, worst laterally.  --- End of Report ---       SHLOMIT A GOLDBERG-STEIN MD; Attending Radiologist This document has been electronically signed. Jul 21 2023  2:43PM      Ordered by: MICAELA JULIAN       Collected/Examined: [id number] 02:36PM        Verified by: MICAELA JULIAN [id number] 03:26PM         Result Communication: No patient communication needed at this time; Stage: Final         Performed at: [facility]       Resulted: [id number] 02:37PM       Last Updated: 21Jul2023 03:26PM       Accession: [id number]
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
## 2023-08-28 ENCOUNTER — APPOINTMENT (OUTPATIENT)
Dept: ORTHOPEDIC SURGERY | Facility: CLINIC | Age: 33
End: 2023-08-28
Payer: COMMERCIAL

## 2023-08-28 DIAGNOSIS — S82.832A OTHER FRACTURE OF UPPER AND LOWER END OF LEFT FIBULA, INITIAL ENCOUNTER FOR CLOSED FRACTURE: ICD-10-CM

## 2023-08-28 DIAGNOSIS — S99.912A UNSPECIFIED INJURY OF LEFT ANKLE, INITIAL ENCOUNTER: ICD-10-CM

## 2023-08-28 PROCEDURE — 73610 X-RAY EXAM OF ANKLE: CPT | Mod: LT

## 2023-08-28 PROCEDURE — 99213 OFFICE O/P EST LOW 20 MIN: CPT | Mod: 24

## 2023-08-28 NOTE — DISCUSSION/SUMMARY
[de-identified] : Today I had a lengthy discussion with the patient regarding their left ankle pain. I have addressed all the patient's concerns surrounding the pathology of their condition. X-ray films obtained today were reviewed in great detail. I recommend the patient undergo a course of physical therapy for the Left ankle 2-3 times a week for a total of 6-8 weeks. A prescription was given for the physical therapy today. I recommended that the patient utilize an ASO brace. The patient was fitted for the ASO brace in the office today.   I recommend the patient follow up in 4-6 weeks to reassess their condition.   The patient understood and verbally agreed to the treatment plan. All of their questions were answered and they were satisfied with the visit. The patient should call the office if they have any questions or experience worsening symptoms.
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
## 2023-08-28 NOTE — ADDENDUM
[FreeTextEntry1] : I, JUAN DIEGO BLAKE, acted solely as a scribe for Dr. Lior Longoria on this date 08/28/2023  .   All medical record entries made by the Scribe were at my, Dr. Lior Longoria, direction and personally dictated by me on 08/28/2023 . I have reviewed the chart and agree that the record accurately reflects my personal performance of the history, physical exam, assessment and plan. I have also personally directed, reviewed, and agreed with the chart.

## 2023-08-28 NOTE — PHYSICAL EXAM
[de-identified] : Left ankle Physical Examination:  General: Alert and oriented x3.  In no acute distress.  Pleasant in nature with a normal affect.  No apparent respiratory distress.  Erythema, Warmth, Rubor: Negative Swelling: Positive  ROM: Limited range of motion due to the fracture and pain with swelling.  Tenderness to Palpation:  1. Lateral Malleolus: Positive 2. Medial Malleolus: Negative 3. Proximal Fibular Pain: Negative 4. Heel Pain: Negative 5. Cuboid: Negative 6. Navicular: Negative 7. Tibiotalar Joint: Negative 8. Subtalar Joint: Negative 9. Posterior Recess: Negative  Tendon Pain: 1. Achilles: Negative 2. Peroneals: Negative 3. Posterior Tibialis: Negative 4. Tibialis Anterior: Negative  Ligament Pain: 1. ATFL: Negative 2. CFL: Negative  3. PTFL: Negative 4. Deltoid Ligaments: Positive 5. Lis Franc Ligament: Negative  Stability:  1. Anterior Drawer: Negative 2. Posterior Drawer: Negative  Strength: 5/5 TA/GS/EHL  Pulses: 2+ DP/PT Pulses  Neuro: Intact motor and sensory  Additional Test: 1. Calcaneal Squeeze Test: Negative 2. Syndesmosis Squeeze Test: Negative [de-identified] : Left ankle xrays ordered, obtained and reviewed by me. Healing distal fibula, well aligned mortise.

## 2023-09-24 NOTE — ED PROVIDER NOTE - SKIN, MLM
Daily Nursing Note:      Behavior:    Patient (Glenn Ruiz is a 53 y.o. male, : 1970, MRN: [id number]) demonstrating an affect that was sad. Glenn demonstrating mood that is depressed. Glenn had an appearance that was disheveled. Glenn denies suicidal ideation. Glenn denies suicide plan. Glenn denies homicidal ideation. Glenn denies hallucinations.    Glenn's  height is 6' (1.829 m) and weight is 100.3 kg (221 lb 1.9 oz). His oral temperature is 97.7 °F (36.5 °C). His blood pressure is 149/85 (abnormal) and his pulse is 62. His respiration is 20 and oxygen saturation is 97%.       Intervention:    Encourage Glenn to perform self-hygiene, grooming, and changing of clothing. Monitor Glenn's behavior and program compliance. Monitor Glenn for suicidal ideation, homicidal ideation, sleep disturbance, and hallucinations. Encourage Glenn to eat all portions of meals and assess for meal preferences. Monitor Glenn for intake and output to ensure hydration. Notify the Physician/Physician Assistant/Advance Practice Registered Nurse (MD/PA/APRN) for any medication refusal and any change in patient condition.      Response:    Glenn verbalizes understand of unit process and procedures. Glenn reported medications are fine without complaints. He just states he is very tired all the time.       Plan:     Continue to monitor per MD/PA/APRN orders; maintain patient safety.    Skin normal color for race, warm, dry and intact. No evidence of rash.

## 2023-09-25 ENCOUNTER — APPOINTMENT (OUTPATIENT)
Dept: ORTHOPEDIC SURGERY | Facility: CLINIC | Age: 33
End: 2023-09-25
Payer: COMMERCIAL

## 2023-09-25 DIAGNOSIS — S82.842A DISPLACED BIMALLEOLAR FRACTURE OF LEFT LOWER LEG, INITIAL ENCOUNTER FOR CLOSED FRACTURE: ICD-10-CM

## 2023-09-25 PROCEDURE — 73610 X-RAY EXAM OF ANKLE: CPT | Mod: LT

## 2023-09-25 PROCEDURE — 99024 POSTOP FOLLOW-UP VISIT: CPT

## 2023-12-08 NOTE — ED ADULT TRIAGE NOTE - WEIGHT IN KG
- Patient has (3) daughters: Sarah Murry who is the power of  and she lives in [place], [place] and the other (2) daughters are from [place], [place] and [place], [place].  - SNF on discharge.    89.4

## 2024-02-20 ENCOUNTER — EMERGENCY (EMERGENCY)
Facility: HOSPITAL | Age: 34
LOS: 1 days | Discharge: LEFT BEFORE DISCHARGE | End: 2024-02-20
Attending: EMERGENCY MEDICINE
Payer: COMMERCIAL

## 2024-02-20 VITALS
SYSTOLIC BLOOD PRESSURE: 132 MMHG | TEMPERATURE: 98 F | OXYGEN SATURATION: 99 % | DIASTOLIC BLOOD PRESSURE: 84 MMHG | HEIGHT: 64 IN | HEART RATE: 100 BPM | RESPIRATION RATE: 18 BRPM | WEIGHT: 214.95 LBS

## 2024-02-20 DIAGNOSIS — Z98.890 OTHER SPECIFIED POSTPROCEDURAL STATES: Chronic | ICD-10-CM

## 2024-02-20 PROCEDURE — 99284 EMERGENCY DEPT VISIT MOD MDM: CPT

## 2024-02-20 PROCEDURE — 99282 EMERGENCY DEPT VISIT SF MDM: CPT

## 2024-02-20 RX ORDER — LIDOCAINE 4 G/100G
1 CREAM TOPICAL ONCE
Refills: 0 | Status: DISCONTINUED | OUTPATIENT
Start: 2024-02-20 | End: 2024-02-27
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
## 2024-02-20 NOTE — ED PROVIDER NOTE - NS ED ROS FT
Constitutional: (-) fever  (-)chills  (-)sweats  Eyes/ENT: +nasal pain  Cardiovascular: (-) chest pain, (-) palpitations (-) edema   Respiratory: (-) cough, (-) shortness of breath   Gastrointestinal: (-)nausea  (-)vomiting, (-) diarrhea  (-) abdominal pain   :  (-)dysuria, (-)frequency, (-)urgency, (-)hematuria  Musculoskeletal: (-) neck pain, (-) back pain, (-) joint pain  Integumentary: (-) rash, (-) edema  Neurological: (+) headache, (-) altered mental status  (+)LOC
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
## 2024-02-20 NOTE — ED PROVIDER NOTE - PHYSICAL EXAMINATION
Gen: Alert, NAD  Head: NC, AT, PERRL, EOMI, normal lids/conjunctiva  ENT: B TM WNL, no nasal septal hematoma.  ttp @ nasal bridge. nt over teetch.   Neck: +supple, no tenderness/meningismus/JVD, +Trachea midline  Pulm: Bilateral BS, normal resp effort, no wheeze/stridor/retractions  CV: RRR, no M/R/G, +dist pulses  Abd: soft, NT/ND, +BS, no hepatosplenomegaly  Mskel:    L UE: from/nt @shoulder/elbow/wrist/hand   R UE: from/nt @shoulder/elbow/wrist/hand   L LE: from/nt @ hip/knee/ankle   R LE: from/nt @hip/knee/ankle   distal pulses intact  BACK: nt midline c/t/l/s spine. left lateral paraspinal muscular tendeness.   Neuro: grossly intact Acitretin Counseling:  I discussed with the patient the risks of acitretin including but not limited to hair loss, dry lips/skin/eyes, liver damage, hyperlipidemia, depression/suicidal ideation, photosensitivity.  Serious rare side effects can include but are not limited to pancreatitis, pseudotumor cerebri, bony changes, clot formation/stroke/heart attack.  Patient understands that alcohol is contraindicated since it can result in liver toxicity and significantly prolong the elimination of the drug by many years.

## 2024-02-22 DIAGNOSIS — S06.9XAA UNSPECIFIED INTRACRANIAL INJURY WITH LOSS OF CONSCIOUSNESS STATUS UNKNOWN, INITIAL ENCOUNTER: ICD-10-CM
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
## 2024-02-22 DIAGNOSIS — J34.89 OTHER SPECIFIED DISORDERS OF NOSE AND NASAL SINUSES: ICD-10-CM

## 2024-02-22 DIAGNOSIS — R51.9 HEADACHE, UNSPECIFIED: ICD-10-CM

## 2024-02-22 DIAGNOSIS — M54.50 LOW BACK PAIN, UNSPECIFIED: ICD-10-CM

## 2024-02-22 DIAGNOSIS — Z53.29 PROCEDURE AND TREATMENT NOT CARRIED OUT BECAUSE OF PATIENT'S DECISION FOR OTHER REASONS: ICD-10-CM
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
## 2024-05-09 NOTE — ED ADULT NURSE NOTE - NS ED NURSE LEVEL OF CONSCIOUSNESS SPEECH
PDMP Monitoring:    Last PDMP Manoj as Reviewed ([place]):  Review User Review Instant Review Result   ABHISHEK BAZAN 4/4/2024  1:15 PM Reviewed PDMP [1]     Urine Drug Screenings (1 yr)       Urine Drug Screen  Collected: 5/3/2020  8:10 PM (Final result)              Urine Drug Screen  Collected: 10/29/2018  9:30 PM (Final result)                  Medication Contract and Consent for Opioid Use Documents Filed       Patient Documents       Type of Document Status Date Received Received By Description    Medication Contract Received 7/18/2023 12:14 PM CLEMENTINA COTTON Medication Contract    Medication Contract Received 8/8/2023  3:03 PM TIFFANIE SMITH                     
From: Flavia Hightower  To: [place]  Sent: 5/9/2024 1:22 PM CDT  Subject: Ativan    Can u get a refill on my Ativan please   
Speaking Coherently

## 2024-08-06 ENCOUNTER — NON-APPOINTMENT (OUTPATIENT)
Age: 34
End: 2024-08-06

## 2024-11-29 ENCOUNTER — EMERGENCY (EMERGENCY)
Facility: HOSPITAL | Age: 34
LOS: 1 days | Discharge: HOME | End: 2024-11-29
Attending: STUDENT IN AN ORGANIZED HEALTH CARE EDUCATION/TRAINING PROGRAM
Payer: COMMERCIAL

## 2024-11-29 VITALS
DIASTOLIC BLOOD PRESSURE: 74 MMHG | HEART RATE: 89 BPM | SYSTOLIC BLOOD PRESSURE: 123 MMHG | OXYGEN SATURATION: 98 % | TEMPERATURE: 98 F | RESPIRATION RATE: 18 BRPM

## 2024-11-29 VITALS
DIASTOLIC BLOOD PRESSURE: 72 MMHG | SYSTOLIC BLOOD PRESSURE: 122 MMHG | OXYGEN SATURATION: 97 % | HEART RATE: 112 BPM | WEIGHT: 196.87 LBS | TEMPERATURE: 97 F | RESPIRATION RATE: 20 BRPM

## 2024-11-29 DIAGNOSIS — Z98.890 OTHER SPECIFIED POSTPROCEDURAL STATES: Chronic | ICD-10-CM

## 2024-11-29 DIAGNOSIS — F10.20 ALCOHOL DEPENDENCE, UNCOMPLICATED: ICD-10-CM

## 2024-11-29 LAB
ALBUMIN SERPL ELPH-MCNC: 4.4 G/DL — SIGNIFICANT CHANGE UP (ref 3.3–5.2)
ALP SERPL-CCNC: 77 U/L — SIGNIFICANT CHANGE UP (ref 40–120)
ALT FLD-CCNC: 16 U/L — SIGNIFICANT CHANGE UP
ANION GAP SERPL CALC-SCNC: 20 MMOL/L — HIGH (ref 5–17)
APAP SERPL-MCNC: <3 UG/ML — LOW (ref 10–26)
AST SERPL-CCNC: 34 U/L — HIGH
BASOPHILS # BLD AUTO: 0.04 K/UL — SIGNIFICANT CHANGE UP (ref 0–0.2)
BASOPHILS NFR BLD AUTO: 0.5 % — SIGNIFICANT CHANGE UP (ref 0–2)
BILIRUB SERPL-MCNC: 0.4 MG/DL — SIGNIFICANT CHANGE UP (ref 0.4–2)
BUN SERPL-MCNC: 13.2 MG/DL — SIGNIFICANT CHANGE UP (ref 8–20)
CALCIUM SERPL-MCNC: 8.9 MG/DL — SIGNIFICANT CHANGE UP (ref 8.4–10.5)
CHLORIDE SERPL-SCNC: 102 MMOL/L — SIGNIFICANT CHANGE UP (ref 96–108)
CO2 SERPL-SCNC: 19 MMOL/L — LOW (ref 22–29)
CREAT SERPL-MCNC: 0.73 MG/DL — SIGNIFICANT CHANGE UP (ref 0.5–1.3)
EGFR: 109 ML/MIN/1.73M2 — SIGNIFICANT CHANGE UP
EOSINOPHIL # BLD AUTO: 0.07 K/UL — SIGNIFICANT CHANGE UP (ref 0–0.5)
EOSINOPHIL NFR BLD AUTO: 0.9 % — SIGNIFICANT CHANGE UP (ref 0–6)
ETHANOL SERPL-MCNC: 264 MG/DL — HIGH (ref 0–9)
GLUCOSE SERPL-MCNC: 92 MG/DL — SIGNIFICANT CHANGE UP (ref 70–99)
HCG SERPL-ACNC: <4 MIU/ML — SIGNIFICANT CHANGE UP
HCT VFR BLD CALC: 37.9 % — SIGNIFICANT CHANGE UP (ref 34.5–45)
HGB BLD-MCNC: 12.9 G/DL — SIGNIFICANT CHANGE UP (ref 11.5–15.5)
HIV 1 & 2 AB SERPL IA.RAPID: SIGNIFICANT CHANGE UP
IMM GRANULOCYTES NFR BLD AUTO: 0.4 % — SIGNIFICANT CHANGE UP (ref 0–0.9)
LYMPHOCYTES # BLD AUTO: 3.09 K/UL — SIGNIFICANT CHANGE UP (ref 1–3.3)
LYMPHOCYTES # BLD AUTO: 41 % — SIGNIFICANT CHANGE UP (ref 13–44)
MCHC RBC-ENTMCNC: 26.5 PG — LOW (ref 27–34)
MCHC RBC-ENTMCNC: 34 G/DL — SIGNIFICANT CHANGE UP (ref 32–36)
MCV RBC AUTO: 78 FL — LOW (ref 80–100)
MONOCYTES # BLD AUTO: 0.36 K/UL — SIGNIFICANT CHANGE UP (ref 0–0.9)
MONOCYTES NFR BLD AUTO: 4.8 % — SIGNIFICANT CHANGE UP (ref 2–14)
NEUTROPHILS # BLD AUTO: 3.94 K/UL — SIGNIFICANT CHANGE UP (ref 1.8–7.4)
NEUTROPHILS NFR BLD AUTO: 52.4 % — SIGNIFICANT CHANGE UP (ref 43–77)
PLATELET # BLD AUTO: 305 K/UL — SIGNIFICANT CHANGE UP (ref 150–400)
POTASSIUM SERPL-MCNC: 3.5 MMOL/L — SIGNIFICANT CHANGE UP (ref 3.5–5.3)
POTASSIUM SERPL-SCNC: 3.5 MMOL/L — SIGNIFICANT CHANGE UP (ref 3.5–5.3)
PROT SERPL-MCNC: 7.7 G/DL — SIGNIFICANT CHANGE UP (ref 6.6–8.7)
RBC # BLD: 4.86 M/UL — SIGNIFICANT CHANGE UP (ref 3.8–5.2)
RBC # FLD: 14.2 % — SIGNIFICANT CHANGE UP (ref 10.3–14.5)
SALICYLATES SERPL-MCNC: <0.6 MG/DL — LOW (ref 10–20)
SODIUM SERPL-SCNC: 141 MMOL/L — SIGNIFICANT CHANGE UP (ref 135–145)
WBC # BLD: 7.53 K/UL — SIGNIFICANT CHANGE UP (ref 3.8–10.5)
WBC # FLD AUTO: 7.53 K/UL — SIGNIFICANT CHANGE UP (ref 3.8–10.5)

## 2024-11-29 PROCEDURE — 90792 PSYCH DIAG EVAL W/MED SRVCS: CPT

## 2024-11-29 PROCEDURE — 80307 DRUG TEST PRSMV CHEM ANLYZR: CPT

## 2024-11-29 PROCEDURE — 85025 COMPLETE CBC W/AUTO DIFF WBC: CPT

## 2024-11-29 PROCEDURE — 84702 CHORIONIC GONADOTROPIN TEST: CPT

## 2024-11-29 PROCEDURE — 93005 ELECTROCARDIOGRAM TRACING: CPT

## 2024-11-29 PROCEDURE — 99285 EMERGENCY DEPT VISIT HI MDM: CPT | Mod: 25

## 2024-11-29 PROCEDURE — 93010 ELECTROCARDIOGRAM REPORT: CPT

## 2024-11-29 PROCEDURE — G0378: CPT

## 2024-11-29 PROCEDURE — 96372 THER/PROPH/DIAG INJ SC/IM: CPT

## 2024-11-29 PROCEDURE — 86703 HIV-1/HIV-2 1 RESULT ANTBDY: CPT

## 2024-11-29 PROCEDURE — 36415 COLL VENOUS BLD VENIPUNCTURE: CPT

## 2024-11-29 PROCEDURE — 99236 HOSP IP/OBS SAME DATE HI 85: CPT

## 2024-11-29 PROCEDURE — 80053 COMPREHEN METABOLIC PANEL: CPT

## 2024-11-29 RX ORDER — HALOPERIDOL 2 MG
5 TABLET ORAL ONCE
Refills: 0 | Status: COMPLETED | OUTPATIENT
Start: 2024-11-29 | End: 2024-11-29

## 2024-11-29 RX ORDER — LORAZEPAM 2 MG/1
2 TABLET ORAL ONCE
Refills: 0 | Status: DISCONTINUED | OUTPATIENT
Start: 2024-11-29 | End: 2024-11-29

## 2024-11-29 RX ADMIN — LORAZEPAM 2 MILLIGRAM(S): 2 TABLET ORAL at 02:17

## 2024-11-29 RX ADMIN — Medication 5 MILLIGRAM(S): at 02:25

## 2024-11-29 RX ADMIN — Medication 5 MILLIGRAM(S): at 02:15

## 2024-11-29 RX ADMIN — Medication 10 MILLIGRAM(S): at 02:25

## 2024-11-29 NOTE — ED BEHAVIORAL HEALTH ASSESSMENT NOTE - DETAILS
states that thoughts of not wanting to be here have been persistent over the course of the last couple of months but denies feelings of wanting to act on these thoughts citing protective factors as above discussed with patient, participated appropriately patient is referent but also informed ex-wife of plan did not want to discuss

## 2024-11-29 NOTE — ED ADULT NURSE NOTE - OBJECTIVE STATEMENT
Pt is agitated and aggressive towards staff. refuse to have men around her. Pt is redirected when females are present. Refuse blood work/EKG and urine. Pt medicated to calm down. Pt placed on a 1:1. Pt having a lot of personal issues and her life is a mess right now. Will continue to monitor closely

## 2024-11-29 NOTE — ED PROVIDER NOTE - ATTENDING CONTRIBUTION TO CARE
Deysi: I performed a face to face evaluation of this patient and performed a full history and physical examination on the patient.  I agree with the resident's history, physical examination, and plan of the patient unless otherwise noted. My brief assessment is as follows: see note.

## 2024-11-29 NOTE — ED BEHAVIORAL HEALTH ASSESSMENT NOTE - MEDICATIONS (PRESCRIPTIONS, DIRECTIONS)
I want you to decrease gabapentin to see if that is causing your nausea.  Decrease to 300mg 3 times a day.    I want you to restart coumadin today taking 5 mg a day.  I have contacted the anticoagulation clininc as I want to have a gaol of 2.0-2.5 instead of the normal goal of 2-3.  They should call you soon to set up recheck.  I want to see you next week to see how your nausea is.       does not take home medications

## 2024-11-29 NOTE — ED BEHAVIORAL HEALTH ASSESSMENT NOTE - NSBHMSEMUSCLE_PSY_A_CORE
DATE:  11/21/2022   TIME OF RECEIPT FROM LAB:  3518  LAB TEST:  ketone  LAB VALUE:  1.9  RESULTS GIVEN WITH READ-BACK TO Randi  TIME LAB VALUE REPORTED TO PROVIDER:   6740     Normal muscle tone/strength

## 2024-11-29 NOTE — ED ADULT NURSE NOTE - NSFALLRISKINTERV_ED_ALL_ED
Assistance OOB with selected safe patient handling equipment if applicable/Assistance with ambulation/Communicate fall risk and risk factors to all staff, patient, and family/Encourage patient to sit up slowly, dangle for a short time, stand at bedside before walking/Monitor gait and stability/Monitor for mental status changes and reorient to person, place, and time, as needed/Orthostatic vital signs/Provide visual cue: yellow wristband, yellow gown, etc/Reinforce activity limits and safety measures with patient and family/Review medications for side effects contributing to fall risk/Toileting schedule using arm’s reach rule for commode and bathroom/Use of alarms - bed, stretcher, chair and/or video monitoring/Call bell, personal items and telephone in reach/Instruct patient to call for assistance before getting out of bed/chair/stretcher/Non-slip footwear applied when patient is off stretcher/[place] to call system/Physically safe environment - no spills, clutter or unnecessary equipment/Purposeful Proactive Rounding/Room/bathroom lighting operational, light cord in reach

## 2024-11-29 NOTE — ED CDU PROVIDER DISPOSITION NOTE - PATIENT PORTAL LINK FT
You can access the FollowMyHealth Patient Portal offered by [facility] by registering at the following website: [email or web address]. By joining [employer]’s FollowMyHealth portal, you will also be able to view your health information using other applications (apps) compatible with our system.

## 2024-11-29 NOTE — ED CDU PROVIDER DISPOSITION NOTE - NSFOLLOWUPINSTRUCTIONS_ED_ALL_ED_FT
** Follow up with your on  ** A referral coordinator will reach out to you to help you schedule an appointemnt.    ** Go to the nearest Emergency Department if you experience any new or concerning symptoms, such as:   - worsening pain  - chest pain  - difficulty breathing  - passing out  - unable to eat or drink  - unable to move or feel part of your body  - fever, chills ** Follow up with your outpatient psychiatry provider.   ** Go to the nearest Emergency Department if you experience any new or concerning symptoms, such as:   - worsening pain  - chest pain  - difficulty breathing  - passing out  - unable to eat or drink  - unable to move or feel part of your body  - fever, chills  - thoughts of harming yourself, harming others  - auditory or visual hallucinations.
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
## 2024-11-29 NOTE — ED BEHAVIORAL HEALTH ASSESSMENT NOTE - DESCRIPTION
as described in HPI Vital Signs Last 24 Hrs  T(C): 36.8 (29 Nov 2024 10:20), Max: 36.8 (29 Nov 2024 10:20)  T(F): 98.2 (29 Nov 2024 10:20), Max: 98.2 (29 Nov 2024 10:20)  HR: 89 (29 Nov 2024 10:20) (89 - 112)  BP: 123/74 (29 Nov 2024 10:20) (109/56 - 123/74)  BP(mean): --  RR: 18 (29 Nov 2024 10:20) (18 - 20)  SpO2: 98% (29 Nov 2024 10:20) (95% - 98%)    Parameters below as of 29 Nov 2024 10:20  Patient On (Oxygen Delivery Method): room air n/a

## 2024-11-29 NOTE — ED ADULT NURSE REASSESSMENT NOTE - NS ED NURSE REASSESS COMMENT FT1
assumed care of pt from cc at 0230. pt in yellow gown for safety. pt responds to verbal stimuli. respirations even and unlabored. pt stating 97% on room air. constant observation in place. 1:1 at bedside.
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
## 2024-11-29 NOTE — ED PROVIDER NOTE - PHYSICAL EXAMINATION
Gen: tearful. awake, alert  HEENT: Mucous membranes moist, pink conjunctivae, EOMI  CV: RRR, nl s1/s2.  Resp: CTAB, normal rate and effort  GI: Abdomen soft, NT, ND. No rebound, no guarding  : No CVAT  Neuro: A&O x 3, moving all 4 extremities  MSK: No spine or joint tenderness to palpation  Skin: No rashes. intact and perfused. no active bleeding/significant lacs to arm. superficial marks to distal left wrist.

## 2024-11-29 NOTE — ED BEHAVIORAL HEALTH ASSESSMENT NOTE - DIFFERENTIAL
#severe alcohol use disorder, currently in withdrawal  #adjustment disorder with disturbance of mood and conduct    r/o unspecified personality disorder

## 2024-11-29 NOTE — ED CDU PROVIDER DISPOSITION NOTE - CLINICAL COURSE
Pt received at [facility] pending re-evaluation and psych consult. pt re-evaluated once alcohol level <100, ambulatory and AOx3, medically cleared, endorse she is still experiencing SI, HI. Pt placed in  and was cleared by psych for outpt followup.

## 2024-11-29 NOTE — ED ADULT NURSE REASSESSMENT NOTE - NS ED NURSE REASSESS COMMENT FT1
Report given to  PARIS Bales. Pt calm and cooperative. Security and SNA Paige escorted pt to  without any difficulty. Belongings and chart also escorted to .

## 2024-11-29 NOTE — ED ADULT NURSE REASSESSMENT NOTE - NS ED NURSE REASSESS COMMENT FT1
Report received from night shift RN Tayla. Pt received in yellow gown with belongings secured prior to this RN receiving pt. Pt endorsing SI at this time, no active plan. One to one remains at bedside. Pending psych eval @ this time.
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
## 2024-11-29 NOTE — ED ADULT NURSE REASSESSMENT NOTE - NS ED NURSE REASSESS COMMENT FT1
Pt is resting in stretcher comfortably at this time. NAD. VSS. Respirations even and unlabored. Pt safety maintained. 1:1 at bedside. Constant observation in place. Plan of care on going.

## 2025-06-10 NOTE — ED CDU PROVIDER INITIAL DAY NOTE - CLINICAL SUMMARY MEDICAL DECISION MAKING FREE TEXT BOX
Chronic. On home albuterol inhaler, ipratropium/albuterol nebs PRN  - Continue home meds 35 y/o female hx  bipolar/depression/ptsd not on meds present with etoh intox and SI. observe for sobriety, psych eval. Chronic. On home albuterol inhaler, ipratropium/albuterol nebs PRN  - Continue home meds Chronic. On home albuterol inhaler, ipratropium/albuterol nebs PRN  - Continue home meds Chronic. On home albuterol inhaler, ipratropium/albuterol nebs PRN  - Continue home meds Chronic. On home albuterol inhaler, ipratropium/albuterol nebs PRN  - Continue home meds Chronic. On home albuterol inhaler, ipratropium/albuterol nebs PRN  - Continue home meds Chronic. On home albuterol inhaler, ipratropium/albuterol nebs PRN  - Continue home meds